# Patient Record
Sex: FEMALE | Race: WHITE | NOT HISPANIC OR LATINO | URBAN - METROPOLITAN AREA
[De-identification: names, ages, dates, MRNs, and addresses within clinical notes are randomized per-mention and may not be internally consistent; named-entity substitution may affect disease eponyms.]

---

## 2024-06-05 ENCOUNTER — EMERGENCY (EMERGENCY)
Facility: HOSPITAL | Age: 34
LOS: 0 days | Discharge: HOME | End: 2024-06-05
Attending: STUDENT IN AN ORGANIZED HEALTH CARE EDUCATION/TRAINING PROGRAM
Payer: COMMERCIAL

## 2024-06-05 VITALS
TEMPERATURE: 98 F | RESPIRATION RATE: 18 BRPM | OXYGEN SATURATION: 100 % | HEART RATE: 76 BPM | DIASTOLIC BLOOD PRESSURE: 84 MMHG | WEIGHT: 145.06 LBS | HEIGHT: 68 IN | SYSTOLIC BLOOD PRESSURE: 135 MMHG

## 2024-06-05 DIAGNOSIS — R10.32 LEFT LOWER QUADRANT PAIN: ICD-10-CM

## 2024-06-05 DIAGNOSIS — R10.12 LEFT UPPER QUADRANT PAIN: ICD-10-CM

## 2024-06-05 PROCEDURE — 99283 EMERGENCY DEPT VISIT LOW MDM: CPT | Mod: 25

## 2024-06-05 PROCEDURE — 96372 THER/PROPH/DIAG INJ SC/IM: CPT

## 2024-06-05 PROCEDURE — 99284 EMERGENCY DEPT VISIT MOD MDM: CPT

## 2024-06-05 RX ORDER — KETOROLAC TROMETHAMINE 30 MG/ML
30 SYRINGE (ML) INJECTION ONCE
Refills: 0 | Status: DISCONTINUED | OUTPATIENT
Start: 2024-06-05 | End: 2024-06-05

## 2024-06-05 RX ORDER — METHOCARBAMOL 500 MG/1
2 TABLET, FILM COATED ORAL
Qty: 30 | Refills: 0
Start: 2024-06-05 | End: 2024-06-09

## 2024-06-05 RX ADMIN — Medication 30 MILLIGRAM(S): at 21:46

## 2024-06-05 NOTE — ED PROVIDER NOTE - OBJECTIVE STATEMENT
34 year old F denies pmhx presenting to er for eval of L sided groin pain x 1 day. Pain is worse when she initially ambulates but improves over time and alleviates with rest. She has no assoc trauma/injuries, radiation to LE, skin changes/redness/swelling, abd pain, nausea, vomiting, alterations in bowel habits. urinary symptoms.

## 2024-06-05 NOTE — ED PROVIDER NOTE - PATIENT PORTAL LINK FT
You can access the FollowMyHealth Patient Portal offered by [facility] by registering at the following website: [email or web address]. By joining [facility]’s FollowMyHealth portal, you will also be able to view your health information using other applications (apps) compatible with our system.

## 2024-06-05 NOTE — ED PROVIDER NOTE - CARE PROVIDER_API CALL
Ivan Pillai  Orthopaedic Surgery  [address] [place] [address]  Phone: [phone]  Fax: [phone]  Follow Up Time:

## 2024-06-05 NOTE — ED PROVIDER NOTE - PHYSICAL EXAMINATION
CONSTITUTIONAL: Well-appearing; well-nourished; in no apparent distress.   EYES: PERRL; EOM intact.   ENT: normal nose; no rhinorrhea; normal pharynx with no tonsillar hypertrophy.   NECK: Supple; non-tender; no cervical lymphadenopathy.   CARDIOVASCULAR: Normal S1, S2; no murmurs, rubs, or gallops. Equal radial pulses  RESPIRATORY: Normal chest excursion with respiration; breath sounds clear and equal bilaterally; no wheezes, rhonchi, or rales.  GI/: Normal bowel sounds; non-distended; non-tender; no palpable organomegaly.   MS: No evidence of trauma or deformity. Normal ROM in all four extremities; non-tender to palpation; distal pulses are normal.   SKIN: Normal for age and race; warm; dry; good turgor; no apparent lesions or exudate.   NEURO/PSYCH: A & O x 4; grossly unremarkable. mood and manner are appropriate. No focal deficits. Strength equal b/l 5/5 throughout. Pt ambulates independently with limp secondary to pain

## 2024-06-05 NOTE — ED PROVIDER NOTE - CLINICAL SUMMARY MEDICAL DECISION MAKING FREE TEXT BOX
33 yo female, no PMHx, presenting for left groin pain that started this morning, worse when initially ambulating from sitting but improves over time, and alleviated at rest, no associated symptoms. She states she has experienced similar symptoms in the past but it always went away on its own. Denies fevers, chills, trauma, weakness, swelling, numbness, radiation, back pain, abdominal pain. Well appearing on exam. Left groin without erythema, ecchymoses, swelling. No hernia. No tenderness to palpation. Pain elicited with active flexion of hip which is limited due to pain. LLE DP pulse 2+. Ambulates independently with limp due to pain but does not require assistance. Medication given and effects reassessed. Medication sent to pharmacy. Discussed return precautions and follow up with PT and orthopedics. Patient comfortable with plan.

## 2024-06-07 ENCOUNTER — APPOINTMENT (OUTPATIENT)
Dept: ORTHOPEDIC SURGERY | Facility: CLINIC | Age: 34
End: 2024-06-07
Payer: COMMERCIAL

## 2024-06-07 ENCOUNTER — APPOINTMENT (OUTPATIENT)
Dept: ORTHOPEDIC SURGERY | Facility: CLINIC | Age: 34
End: 2024-06-07

## 2024-06-07 VITALS — WEIGHT: 150 LBS | BODY MASS INDEX: 22.73 KG/M2 | HEIGHT: 68 IN

## 2024-06-07 DIAGNOSIS — M25.552 PAIN IN LEFT HIP: ICD-10-CM

## 2024-06-07 PROBLEM — Z00.00 ENCOUNTER FOR PREVENTIVE HEALTH EXAMINATION: Status: ACTIVE | Noted: 2024-06-07

## 2024-06-07 PROCEDURE — 99203 OFFICE O/P NEW LOW 30 MIN: CPT

## 2024-06-07 PROCEDURE — 73502 X-RAY EXAM HIP UNI 2-3 VIEWS: CPT

## 2024-06-07 RX ORDER — IBUPROFEN 600 MG/1
600 TABLET ORAL
Qty: 42 | Refills: 0 | Status: ACTIVE | COMMUNITY
Start: 2024-06-07 | End: 1900-01-01

## 2024-06-07 NOTE — HISTORY OF PRESENT ILLNESS
[de-identified] : 34-year-old female presents for left hip pain.  This pain started 2 days ago atraumatically.  Patient denies any past injuries or surgeries to the area.  She states she just darted having a lot of pain in the hip that brought her to tears so she went to the emergency room at that point they gave her an IM injection and a muscle relaxant.  She is starting to feel better by taking the muscle relaxants.  Patient denies any numbness and tingling down the leg, denies any pain in the lower back.

## 2024-06-07 NOTE — PHYSICAL EXAM
[de-identified] : Physical exam of the left hip: -No erythema, edema, ecchymosis present.  Skin intact -TTP over groin region.  No tenderness palpation over the lateral aspect of hip, gluteus region, lower back -Full range of motion of the hip, pain with hip flexion. -Posterior tibialis pulse +2, sensation intact to light touch

## 2024-06-07 NOTE — DATA REVIEWED
[FreeTextEntry1] : X-ray images were obtained at the office today.  AP, lateral views of the left hip and pelvis reveal no acute fractures, dislocations, bony abnormalities

## 2024-06-07 NOTE — DISCUSSION/SUMMARY
[de-identified] : Patient may have a strain of the left hip flexors versus labrum tear.  At this time since patient has only had pain for 2 days atraumatic and has only been taking muscle relaxants as conservative treatment I did advise that an MRI is not necessary at this time and typically we can treat empirically with physical therapy.  Patient would like an MRI because she would like to know what is going on for sure.  Additionally physical therapy was given to her I do want her to do this for 4 to 6 weeks and she does not need the MRI to initiate the therapy.  I am also giving patient prescription for ibuprofen 600 mg to take as needed for pain and inflammation.  Follow-up in approximately 1 month.

## 2024-06-17 ENCOUNTER — APPOINTMENT (OUTPATIENT)
Dept: MRI IMAGING | Facility: CLINIC | Age: 34
End: 2024-06-17

## 2024-07-16 ENCOUNTER — APPOINTMENT (OUTPATIENT)
Dept: ORTHOPEDIC SURGERY | Facility: CLINIC | Age: 34
End: 2024-07-16